# Patient Record
Sex: FEMALE | Race: WHITE
[De-identification: names, ages, dates, MRNs, and addresses within clinical notes are randomized per-mention and may not be internally consistent; named-entity substitution may affect disease eponyms.]

---

## 2018-03-12 ENCOUNTER — HOSPITAL ENCOUNTER (INPATIENT)
Dept: HOSPITAL 17 - PHEDDLT | Age: 43
LOS: 1 days | Discharge: HOME | DRG: 103 | End: 2018-03-13
Attending: HOSPITALIST | Admitting: HOSPITALIST
Payer: MEDICAID

## 2018-03-12 VITALS — HEART RATE: 67 BPM

## 2018-03-12 VITALS
HEART RATE: 71 BPM | RESPIRATION RATE: 20 BRPM | TEMPERATURE: 97.9 F | DIASTOLIC BLOOD PRESSURE: 77 MMHG | OXYGEN SATURATION: 96 % | SYSTOLIC BLOOD PRESSURE: 139 MMHG

## 2018-03-12 VITALS — BODY MASS INDEX: 28.95 KG/M2 | HEIGHT: 71 IN | WEIGHT: 206.79 LBS

## 2018-03-12 DIAGNOSIS — G43.109: Primary | ICD-10-CM

## 2018-03-12 DIAGNOSIS — Z98.84: ICD-10-CM

## 2018-03-12 DIAGNOSIS — E83.51: ICD-10-CM

## 2018-03-12 PROCEDURE — 80307 DRUG TEST PRSMV CHEM ANLYZR: CPT

## 2018-03-12 PROCEDURE — 70551 MRI BRAIN STEM W/O DYE: CPT

## 2018-03-12 PROCEDURE — 70544 MR ANGIOGRAPHY HEAD W/O DYE: CPT

## 2018-03-12 PROCEDURE — 82550 ASSAY OF CK (CPK): CPT

## 2018-03-12 PROCEDURE — 81001 URINALYSIS AUTO W/SCOPE: CPT

## 2018-03-12 PROCEDURE — 85025 COMPLETE CBC W/AUTO DIFF WBC: CPT

## 2018-03-12 PROCEDURE — 70450 CT HEAD/BRAIN W/O DYE: CPT

## 2018-03-12 PROCEDURE — 93005 ELECTROCARDIOGRAM TRACING: CPT

## 2018-03-12 PROCEDURE — 93880 EXTRACRANIAL BILAT STUDY: CPT

## 2018-03-12 PROCEDURE — 84443 ASSAY THYROID STIM HORMONE: CPT

## 2018-03-12 PROCEDURE — 82140 ASSAY OF AMMONIA: CPT

## 2018-03-12 PROCEDURE — 83036 HEMOGLOBIN GLYCOSYLATED A1C: CPT

## 2018-03-12 PROCEDURE — 80061 LIPID PANEL: CPT

## 2018-03-12 PROCEDURE — 82948 REAGENT STRIP/BLOOD GLUCOSE: CPT

## 2018-03-12 PROCEDURE — 80053 COMPREHEN METABOLIC PANEL: CPT

## 2018-03-12 PROCEDURE — 99285 EMERGENCY DEPT VISIT HI MDM: CPT

## 2018-03-12 RX ADMIN — PHENYTOIN SODIUM SCH MLS/HR: 50 INJECTION INTRAMUSCULAR; INTRAVENOUS at 22:45

## 2018-03-12 RX ADMIN — Medication SCH ML: at 22:45

## 2018-03-12 RX ADMIN — INSULIN ASPART SCH: 100 INJECTION, SOLUTION INTRAVENOUS; SUBCUTANEOUS at 23:00

## 2018-03-13 VITALS
DIASTOLIC BLOOD PRESSURE: 82 MMHG | TEMPERATURE: 98.1 F | HEART RATE: 67 BPM | OXYGEN SATURATION: 100 % | SYSTOLIC BLOOD PRESSURE: 136 MMHG | RESPIRATION RATE: 18 BRPM

## 2018-03-13 VITALS
TEMPERATURE: 97.8 F | DIASTOLIC BLOOD PRESSURE: 88 MMHG | OXYGEN SATURATION: 100 % | SYSTOLIC BLOOD PRESSURE: 128 MMHG | HEART RATE: 79 BPM | RESPIRATION RATE: 15 BRPM

## 2018-03-13 VITALS
RESPIRATION RATE: 18 BRPM | HEART RATE: 74 BPM | DIASTOLIC BLOOD PRESSURE: 82 MMHG | SYSTOLIC BLOOD PRESSURE: 133 MMHG | TEMPERATURE: 98.5 F | OXYGEN SATURATION: 100 %

## 2018-03-13 VITALS
TEMPERATURE: 97.3 F | DIASTOLIC BLOOD PRESSURE: 93 MMHG | SYSTOLIC BLOOD PRESSURE: 141 MMHG | OXYGEN SATURATION: 98 % | RESPIRATION RATE: 16 BRPM | HEART RATE: 78 BPM

## 2018-03-13 LAB
ALBUMIN SERPL-MCNC: 3.3 GM/DL (ref 3.4–5)
ALP SERPL-CCNC: 83 U/L (ref 45–117)
ALT SERPL-CCNC: 16 U/L (ref 10–53)
AST SERPL-CCNC: 15 U/L (ref 15–37)
BACTERIA #/AREA URNS HPF: (no result) /HPF
BASOPHILS # BLD AUTO: 0 TH/MM3 (ref 0–0.2)
BASOPHILS NFR BLD: 1 % (ref 0–2)
BILIRUB SERPL-MCNC: (no result) MG/DL (ref 0.2–1)
BUN SERPL-MCNC: 7 MG/DL (ref 7–18)
CALCIUM SERPL-MCNC: 8.1 MG/DL (ref 8.5–10.1)
CHLORIDE SERPL-SCNC: 107 MEQ/L (ref 98–107)
CHOLEST SERPL-MCNC: 260 MG/DL (ref 120–200)
CHOLESTEROL/ HDL RATIO: 4.99 RATIO
COLOR UR: YELLOW
CREAT SERPL-MCNC: 0.51 MG/DL (ref 0.5–1)
EOSINOPHIL # BLD: 0.3 TH/MM3 (ref 0–0.4)
EOSINOPHIL NFR BLD: 5.7 % (ref 0–4)
ERYTHROCYTE [DISTWIDTH] IN BLOOD BY AUTOMATED COUNT: 26.2 % (ref 11.6–17.2)
GFR SERPLBLD BASED ON 1.73 SQ M-ARVRAT: 133 ML/MIN (ref 89–?)
GLUCOSE SERPL-MCNC: 77 MG/DL (ref 74–106)
GLUCOSE UR STRIP-MCNC: (no result) MG/DL
HBA1C MFR BLD: 5.5 % (ref 4.3–6)
HCO3 BLD-SCNC: 24.9 MEQ/L (ref 21–32)
HCT VFR BLD CALC: 34.5 % (ref 35–46)
HDLC SERPL-MCNC: 52.1 MG/DL (ref 40–60)
HGB BLD-MCNC: 11.2 GM/DL (ref 11.6–15.3)
HGB UR QL STRIP: (no result)
KETONES UR STRIP-MCNC: (no result) MG/DL
LDLC SERPL-MCNC: 182 MG/DL (ref 0–99)
LEUKOCYTE ESTERASE UR QL STRIP: (no result) /HPF (ref 0–5)
LYMPHOCYTES # BLD AUTO: 1.9 TH/MM3 (ref 1–4.8)
LYMPHOCYTES NFR BLD AUTO: 40.4 % (ref 9–44)
MCH RBC QN AUTO: 27.1 PG (ref 27–34)
MCHC RBC AUTO-ENTMCNC: 32.4 % (ref 32–36)
MCV RBC AUTO: 83.8 FL (ref 80–100)
MONOCYTE #: 0.4 TH/MM3 (ref 0–0.9)
MONOCYTES NFR BLD: 9.2 % (ref 0–8)
MUCOUS THREADS #/AREA URNS LPF: (no result) /LPF
NEUTROPHILS # BLD AUTO: 2.2 TH/MM3 (ref 1.8–7.7)
NEUTROPHILS NFR BLD AUTO: 43.7 % (ref 16–70)
NITRITE UR QL STRIP: (no result)
OVALOCYTES BLD QL SMEAR: (no result)
PLATELET # BLD: 327 TH/MM3 (ref 150–450)
PMV BLD AUTO: 7.5 FL (ref 7–11)
PROT SERPL-MCNC: 7 GM/DL (ref 6.4–8.2)
RBC # BLD AUTO: 4.12 MIL/MM3 (ref 4–5.3)
SODIUM SERPL-SCNC: 140 MEQ/L (ref 136–145)
SP GR UR STRIP: 1.02 (ref 1–1.03)
SQUAMOUS #/AREA URNS HPF: (no result) /HPF (ref 0–5)
TRIGL SERPL-MCNC: 131 MG/DL (ref 42–150)
URINE LEUKOCYTE ESTERASE: (no result)
WBC # BLD AUTO: 4.8 TH/MM3 (ref 4–11)

## 2018-03-13 RX ADMIN — INSULIN ASPART SCH: 100 INJECTION, SOLUTION INTRAVENOUS; SUBCUTANEOUS at 08:00

## 2018-03-13 RX ADMIN — Medication SCH ML: at 09:00

## 2018-03-13 RX ADMIN — INSULIN ASPART SCH: 100 INJECTION, SOLUTION INTRAVENOUS; SUBCUTANEOUS at 17:00

## 2018-03-13 RX ADMIN — PHENYTOIN SODIUM SCH MLS/HR: 50 INJECTION INTRAMUSCULAR; INTRAVENOUS at 17:30

## 2018-03-13 RX ADMIN — BUTALBITAL, ACETAMINOPHEN, AND CAFFEINE PRN TAB: 50; 325; 40 TABLET ORAL at 17:58

## 2018-03-13 RX ADMIN — PHENYTOIN SODIUM SCH MLS/HR: 50 INJECTION INTRAMUSCULAR; INTRAVENOUS at 07:30

## 2018-03-13 RX ADMIN — BUTALBITAL, ACETAMINOPHEN, AND CAFFEINE PRN TAB: 50; 325; 40 TABLET ORAL at 10:00

## 2018-03-13 RX ADMIN — INSULIN ASPART SCH: 100 INJECTION, SOLUTION INTRAVENOUS; SUBCUTANEOUS at 12:00

## 2018-03-13 NOTE — RADRPT
EXAM DATE/TIME:  03/13/2018 11:25 

 

[facility] COMPARISON:     

No previous studies available for comparison.

       

 

 

INDICATIONS :     

CVA.

                     

 

MEDICAL HISTORY :           

Migraines

 

SURGICAL HISTORY :     

Gastric bypass.      

 

ENCOUNTER:     

Initial

 

ACUITY:     

2 day

 

PAIN SCORE:     

8/10

 

LOCATION:         

head

 

Please note a normal MRA of the brain does not entirely exclude the possibility of a small aneurysm, 


nor the possibility of distal intracranial vessel disease.

 

TECHNIQUE:     

3D time of flight MRA was performed.  Source images, multiplanar STS MIP, and 3D volume MIP reconstru
ctions were reviewed.

 

FINDINGS:     

There is excellent visualization of the major intracranial arteries out to the second-order branch ve
ssels.  There is no evidence for aneurysm, vessel truncation or stenosis, and no evidence for vascula
r malformation. Dominant left vertebral artery. Small anterior communicating artery. Posterior commun
icating arteries are not visualized.

 

CONCLUSION:     

1. No large vessel stenosis or aneurysm.

 

 

 

 Laron Angel MD on March 13, 2018 at 12:03           

Board Certified Radiologist.

 This report was verified electronically.

## 2018-03-13 NOTE — HHI.HP
__________________________________________________





[facility]


Service


[facility]


Primary Care Physician


No Primary Care Physician


Admission Diagnosis





Diagnoses:  


Chief Complaint:  


Altered mental status


Travel History


International Travel<30 Days:  No


Contact w/Intl Traveler <30 Da:  No


Traveled to Known Affected Are:  No


History of Present Illness


This is a 42-year-old female with history of complex migraine, and pineal cyst 

without any other comorbidities presenting with altered mental status.  Per 

patient and ED documentation, the [place], after that, she 

passed out and cannot remember exactly what happened.  She remembered being 

awakened and brought to the first aid center.  Since then, allegedly per patient

's , the patient has been acting weird.  She ate 2 slices of pizza which 

she doesn't usually do.  She was brought home, vomited, put her underwear on 

her head and asking to go multiple times if he wants to go to the bathroom.  

There was no note of a port rolling of eyeballs, incontinence or tonic-clonic 

movements.  Per , this has happened before.  She cannot remember exactly 

what happened yesterday but recall the story as it was told her.  Presently, 

she is very anxious because she has missed her medications from yesterday.  She 

has a severe headache.  Patient denies any chest pain, shortness of breath, 

nausea, dysuria, frequency, urgency, neck pain or stiffness, blurring of vision

, numbness, dysarthria, or focal weakness.  She also further denies any fever, 

cough or shortness of breath.








She is on multiple medications for her complex migraine and she follows up with 

a neurologist Dr. Schrader





Review of Systems


ROS Limitations:  Other (All other pertinent systems were reviewed and are 

negative.)





Past Family Social History


Past Medical History


Pineal cyst


Complex migraine


Past Surgical History


Gastric bypass


Reported Medications





Relpax (Eletriptan) 20 Mg Tab 20 Mg PO ONCE PRN


Fioricet (Butalbital-Acetaminophen-Caffeine) -40 Mg Cap 1 Cap PO Q4H PRN


Topiramate 50 Mg Tab 50 Mg PO HS


Baclofen 10 Mg Tab 40 Mg PO HS


Sertraline (Sertraline HCl) 100 Mg Tab 200 Mg PO HS


Hydroxyzine Pamoate 25 Mg Cap 25 Mg PO BID


Gabapentin 300 Mg Cap 1,500 Mg PO HS


Allergies:  


Coded Allergies:  


     No Known Allergies (Unverified , 3/12/18)


Family History


Father has questionable history of seizures, diabetes


Mother has Thyroid issues.


Social History


Patient denies smoking, significant alcohol intake or use of any  illicit drugs.





Physical Exam


Vital Signs





Vital Signs








  Date Time  Temp Pulse Resp B/P (MAP) Pulse Ox O2 Delivery O2 Flow Rate FiO2


 


3/13/18 08:00 97.3 78 16 141/93 (109) 98   


 


3/13/18 04:00 98.5 74 18 133/82 (99) 100   


 


3/13/18 00:00 98.1 67 18 136/82 (100) 100   


 


3/12/18 23:00  67      


 


3/12/18 20:58 97.9 71 20 139/77 (97) 96   








Physical Exam


GENERAL: Not in acute distress, well-nourished.


HEAD: Atraumatic. Normocephalic. No temporal or scalp tenderness.


EYES: PERRL, full EOMs, no jaundice, nonicteric, pink conjunctivae without 

injection, moist mucosa


ENT: Nose without bleeding, purulent drainage.  Airway patent.


NECK: Trachea midline, no mass, no obvious thyromegaly.  


CARDIOVASCULAR: Regular rate and rhythm without murmurs, gallops, or rubs. 


RESPIRATORY: Clear to auscultation with normal respiratory effort. Breath 

sounds equal bilaterally.  No use of accessory muscles of respiration.


GASTROINTESTINAL: Abdomen soft, normal bowel sounds, non-tender, nondistended.  

No guarding. SHAWNA and  exam deferred.


MUSCULOSKELETAL: Extremities without clubbing, cyanosis, or edema.  Distal 

pulses intact, 2+ bilaterally. 


INTEGUMENTARY: Warm and dry, no rash of generalized distribution.


NEUROLOGICAL:  Alert, awake and oriented x3. Remote memory intact.  Immediate 

memory intact.  Recent memory deficient.  Concentration, judgment and higher 

function WNL. No cranial deficits: Pupils equal round reactive to light and 

accommodation, no facial asymmetry, shoulder shrug strong and symmetric, gross 

hearing intact, tongue midline. Moves all 4 extremities, muscle strength 

testing 5 over 5 all 4 extremities. Pronator drift negative, digiti quinti 

minimi sign negative. No sensory deficits with touch sensation.  Babinski 

downgoing, clonus deferred. Negative meningeal signs. Gait testing deferred


PSYCHIATRIC: Anxious.


Laboratory





Laboratory Tests








Test


  3/13/18


05:22


 


White Blood Count 4.8 


 


Red Blood Count 4.12 


 


Hemoglobin 11.2 


 


Hematocrit 34.5 


 


Mean Corpuscular Volume 83.8 


 


Mean Corpuscular Hemoglobin 27.1 


 


Mean Corpuscular Hemoglobin


Concent 32.4 


 


 


Red Cell Distribution Width 26.2 


 


Platelet Count 327 


 


Mean Platelet Volume 7.5 


 


Neutrophils (%) (Auto) 43.7 


 


Lymphocytes (%) (Auto) 40.4 


 


Monocytes (%) (Auto) 9.2 


 


Eosinophils (%) (Auto) 5.7 


 


Basophils (%) (Auto) 1.0 


 


Neutrophils # (Auto) 2.2 


 


Lymphocytes # (Auto) 1.9 


 


Monocytes # (Auto) 0.4 


 


Eosinophils # (Auto) 0.3 


 


Basophils # (Auto) 0.0 


 


CBC Comment AUTO DIFF 


 


Differential Comment


  AUTO DIFF


CONFIRMED


 


Platelet Estimate NORMAL 


 


Platelet Morphology Comment NORMAL 


 


Ovalocytes 1+ 


 


Blood Urea Nitrogen 7 


 


Creatinine 0.51 


 


Random Glucose 77 


 


Total Protein 7.0 


 


Albumin 3.3 


 


Calcium Level 8.1 


 


Alkaline Phosphatase 83 


 


Aspartate Amino Transf


(AST/SGOT) 15 


 


 


Alanine Aminotransferase


(ALT/SGPT) 16 


 


 


Total Bilirubin LESS THAN 0.1 


 


Sodium Level 140 


 


Potassium Level 3.5 


 


Chloride Level 107 


 


Carbon Dioxide Level 24.9 


 


Anion Gap 8 


 


Estimat Glomerular Filtration


Rate 133 


 


 


Triglycerides Level 131 


 


Cholesterol Level 260 


 


LDL Cholesterol 182 


 


HDL Cholesterol 52.1 


 


Cholesterol/HDL Ratio 4.99 








Result Diagram:  


3/13/18 0522                                                                   

             3/13/18 0522





Imaging


CT scan of the head unremarkable





Caprini VTE Risk Assessment


Caprini VTE Risk Assessment:  No/Low Risk (score <= 1)


Caprini Risk Assessment Model











 Point Value = 1          Point Value = 2  Point Value = 3  Point Value = 5


 


Age 41-60


Minor surgery


BMI > 25 kg/m2


Swollen legs


Varicose veins


Pregnancy or postpartum


History of unexplained or recurrent


   spontaneous 


Oral contraceptives or hormone


   replacement


Sepsis (< 1 month)


Serious lung disease, including


   pneumonia (< 1 month)


Abnormal pulmonary function


Acute myocardial infarction


Congestive heart failure (< 1 month)


History of inflammatory bowel disease


Medical patient at bed rest Age 61-74


Arthroscopic surgery


Major open surgery (> 45 min)


Laparoscopic surgery (> 45 min)


Malignancy


Confined to bed (> 72 hours)


Immobilizing plaster cast


Central venous access Age >= 75


History of VTE


Family history of VTE


Factor V Leiden


Prothrombin 45393F


Lupus anticoagulant


Anticardiolipin antibodies


Elevated serum homocysteine


Heparin-induced thrombocytopenia


Other congenital or acquired


   thrombophilia Stroke (< 1 month)


Elective arthroplasty


Hip, pelvis, or leg fracture


Acute spinal cord injury (< 1 month)








Prophylaxis Regimen











   Total Risk


Factor Score Risk Level Prophylaxis Regimen


 


0-1      Low Early ambulation


 


2 Moderate Order ONE of the following:


*Sequential Compression Device (SCD)


*Heparin 5000 units SQ BID


 


3-4 Higher Order ONE of the following medications:


*Heparin 5000 units SQ TID


*Enoxaparin/Lovenox 40 mg SQ daily (WT < 150 kg, CrCl > 30 mL/min)


*Enoxaparin/Lovenox 30 mg SQ daily (WT < 150 kg, CrCl > 10-29 mL/min)


*Enoxaparin/Lovenox 30 mg SQ BID (WT < 150 kg, CrCl > 30 mL/min)


AND/OR


*Sequential Compression Device (SCD)


 


5 or more Highest Order ONE of the following medications:


*Heparin 5000 units SQ TID (Preferred with Epidurals)


*Enoxaparin/Lovenox 40 mg SQ daily (WT < 150 kg, CrCl > 30 mL/min)


*Enoxaparin/Lovenox 30 mg SQ daily (WT < 150 kg, CrCl > 10-29 mL/min)


*Enoxaparin/Lovenox 30 mg SQ BID (WT < 150 kg, CrCl > 30 mL/min)


AND


*Sequential Compression Device (SCD)











Assessment and Plan


Assessment and Plan


This is a 42-year-old female admitted for altered mental status, likely 

secondary to complex migraine with aura





Altered mental status-likely secondary to complex migraine with aura, no focal 

deficits at all, CT scan of the head unremarkable, no risk factors for stroke.  

Follow-up MRI/MRA results, if unremarkable, she can be discharged to follow-up 

with her neurologist who closely sees her as outpatient.  Continue current 

medications from home. No AEDs.  Carotid ultrasound unremarkable.  Check 

urinalysis.





Pseudo-hypocalcemia- albumin-corrected calcium is 8.7.








DVT prophylaxis: Low risk.


Discussed Condition With


RN.  If MRI/MRA and urinalysis are negative, may be discharged today.











Berta Moctezuma MD Mar 13, 2018 09:35

## 2018-03-13 NOTE — RADRPT
EXAM DATE/TIME:  03/13/2018 11:25 

 

[facility] COMPARISON:     

CT BRAIN W/O CONTRAST, March 12, 2018, 14:41.

       

 

 

INDICATIONS :     

Altered mental status. CVA.

                     

 

MEDICAL HISTORY :           

Migraines.

 

SURGICAL HISTORY :     

Gastric bypass.      

 

ENCOUNTER:     

Initial

 

ACUITY:     

2 day

 

PAIN SCORE:     

8/10

 

LOCATION:         

head

 

TECHNIQUE:     

Multiplanar, multisequence MRI of the brain was performed without contrast.

 

FINDINGS:     

 

CEREBRUM:     

The ventricles are normal for age.  No evidence of midline shift, mass lesion, hemorrhage or acute in
farction.  No extraaxial fluid collections are seen.  The pituitary gland and suprasellar cistern are
 normal in configuration.

 

WHITE MATTER:     

No significant signal abnormalities are seen in the white matter.

 

POSTERIOR FOSSA:     

The cerebellum and brainstem are intact.  The 4th ventricle is midline. The cerebellopontine angle is
 unremarkable.  The cerebellar tonsils are normal in position.

 

DIFFUSION IMAGING:     

No focal areas of restricted diffusion are seen.  No evidence of acute infarction.

 

EXTRACRANIAL:     

The visualized portions of the orbits and paranasal sinuses are unremarkable.

 

CONCLUSION:     

Unremarkable MRI brain.

 

 

 

 Laron Angel MD on March 13, 2018 at 12:01           

Board Certified Radiologist.

 This report was verified electronically.

## 2018-03-13 NOTE — RADRPT
EXAM DATE/TIME:  2018 08:44 

 

[facility] COMPARISON:     

No previous studies available for comparison.

        

 

 

INDICATIONS :     

Cerebrovascular accident. 

                     

 

MEDICAL HISTORY :           

Migraine. 

 

SURGICAL HISTORY :     

Gastric bypass.   section.   

 

ENCOUNTER:     

Initial

 

ACUITY:     

1 day

 

PAIN SCORE:     

0/10

 

LOCATION:     

Bilateral neck 

                     

PEAK SYSTOLIC VELOCITIES (cm/sec):

 

ICA/CCA RATIO:                    

Right: 0.8     Left: 1.1

 

ICA:                          

Right: 74     Left: 98

 

CCA:                          

Right: 92     Left: 93

 

ECA:                           

Right: 79     Left: 75

 

VERTEBRAL:           

Right: 40 antegrade     Left: 68 antegrade

             

Elevated flow velocities and ICA/CCA ratios have been found to correlate with increased degrees of

vessel stenosis, calculated as percentage of diameter relative to a normal segment of distal ICA/CCA

 

FINDINGS:     

 

RIGHT CAROTID:     

No significant stenosis is visualized.  The waveforms are within normal limits.

 

LEFT CAROTID:     

No significant stenosis is visualized.  The waveforms are within normal limits.

 

VERTEBRAL ARTERIES:  

Antegrade flow is seen in both vertebral arteries.

 

MISCELLANEOUS:     

None.

 

CONCLUSION:     

Normal examination for a patient of this age.  

 

 

 

 Hans Manzanares MD on 2018 at 9:27           

Board Certified Radiologist.

 This report was verified electronically.